# Patient Record
(demographics unavailable — no encounter records)

---

## 2024-12-05 NOTE — HISTORY OF PRESENT ILLNESS
[FreeTextEntry1] : Doing well, denying CP, SOB  Neuropathy symptoms, balance issues  Eats well Sleeps well with foster  Has appointment with PMD on Tuesday